# Patient Record
Sex: MALE | Race: WHITE | NOT HISPANIC OR LATINO | URBAN - NONMETROPOLITAN AREA
[De-identification: names, ages, dates, MRNs, and addresses within clinical notes are randomized per-mention and may not be internally consistent; named-entity substitution may affect disease eponyms.]

---

## 2021-01-15 DIAGNOSIS — Z23 NEED FOR VACCINATION: ICD-10-CM

## 2022-10-11 ENCOUNTER — OFFICE VISIT (OUTPATIENT)
Dept: MEDICAL GROUP | Facility: CLINIC | Age: 77
End: 2022-10-11
Payer: MEDICARE

## 2022-10-11 VITALS
SYSTOLIC BLOOD PRESSURE: 112 MMHG | TEMPERATURE: 99.4 F | WEIGHT: 155.6 LBS | OXYGEN SATURATION: 95 % | BODY MASS INDEX: 24.42 KG/M2 | HEART RATE: 79 BPM | HEIGHT: 67 IN | RESPIRATION RATE: 16 BRPM | DIASTOLIC BLOOD PRESSURE: 72 MMHG

## 2022-10-11 DIAGNOSIS — E55.9 VITAMIN D DEFICIENCY: ICD-10-CM

## 2022-10-11 DIAGNOSIS — Z23 NEED FOR VACCINATION: ICD-10-CM

## 2022-10-11 DIAGNOSIS — Z00.00 ROUTINE PHYSICAL EXAMINATION: ICD-10-CM

## 2022-10-11 DIAGNOSIS — E78.5 HYPERLIPIDEMIA, UNSPECIFIED HYPERLIPIDEMIA TYPE: ICD-10-CM

## 2022-10-11 DIAGNOSIS — Z12.11 SCREENING FOR COLORECTAL CANCER: ICD-10-CM

## 2022-10-11 DIAGNOSIS — N40.1 BENIGN PROSTATIC HYPERPLASIA WITH WEAK URINARY STREAM: ICD-10-CM

## 2022-10-11 DIAGNOSIS — G47.20 CIRCADIAN RHYTHM DISORDER: ICD-10-CM

## 2022-10-11 DIAGNOSIS — Z12.12 SCREENING FOR COLORECTAL CANCER: ICD-10-CM

## 2022-10-11 DIAGNOSIS — R39.12 BENIGN PROSTATIC HYPERPLASIA WITH WEAK URINARY STREAM: ICD-10-CM

## 2022-10-11 PROBLEM — K59.09 CHRONIC CONSTIPATION: Status: ACTIVE | Noted: 2022-10-11

## 2022-10-11 PROBLEM — T78.40XA ALLERGY: Status: ACTIVE | Noted: 2022-10-11

## 2022-10-11 PROCEDURE — 99204 OFFICE O/P NEW MOD 45 MIN: CPT | Mod: 25 | Performed by: PHYSICIAN ASSISTANT

## 2022-10-11 PROCEDURE — G0008 ADMIN INFLUENZA VIRUS VAC: HCPCS | Performed by: PHYSICIAN ASSISTANT

## 2022-10-11 PROCEDURE — 90662 IIV NO PRSV INCREASED AG IM: CPT | Performed by: PHYSICIAN ASSISTANT

## 2022-10-11 RX ORDER — TRAZODONE HYDROCHLORIDE 100 MG/1
100 TABLET ORAL NIGHTLY
COMMUNITY

## 2022-10-11 RX ORDER — PRAZOSIN HYDROCHLORIDE 1 MG/1
1 CAPSULE ORAL NIGHTLY
COMMUNITY
End: 2023-01-13

## 2022-10-11 RX ORDER — PHENOL 1.4 %
AEROSOL, SPRAY (ML) MUCOUS MEMBRANE
COMMUNITY

## 2022-10-11 RX ORDER — VITAMIN B COMPLEX
1000 TABLET ORAL DAILY
COMMUNITY

## 2022-10-11 RX ORDER — COVID-19 MOLECULAR TEST ASSAY
KIT MISCELLANEOUS
COMMUNITY
Start: 2022-07-31 | End: 2022-10-11

## 2022-10-11 RX ORDER — CHLORAL HYDRATE 500 MG
1000 CAPSULE ORAL
COMMUNITY

## 2022-10-11 ASSESSMENT — PATIENT HEALTH QUESTIONNAIRE - PHQ9: CLINICAL INTERPRETATION OF PHQ2 SCORE: 0

## 2022-10-11 NOTE — LETTER
[employer]  Lore Hernandez P.A.-C.  [address]  [facility] [address]  Fax: [phone]   Authorization for Release/Disclosure of   Protected Health Information   Name: NOLAN LYNN : 1945 SSN: [id number]   Address: [address] [place] [id number] Phone:    [phone] (home)    I authorize the entity listed below to release/disclose the PHI below to:   [facility]/Lore Hernandez P.A.-C. and Lore Hernandez P.A.-C.   Provider [facility] Chanelle Urban et al   Address   [facility], [address] [address]   Phone:  [phone]    Fax:     Reason for request: continuity of care   Information to be released:    [  ] LAST COLONOSCOPY,  including any PATH REPORT and follow-up  [  ] LAST FIT/COLOGUARD RESULT [  ] LAST DEXA  [  ] LAST MAMMOGRAM  [  ] LAST PAP  [  ] LAST LABS [  ] RETINA EXAM REPORT  [  ] IMMUNIZATION RECORDS  [XXX] Release all info      [XXX] Check here and initial the line next to each item to release ALL health information INCLUDING  _____ Care and treatment for drug and / or alcohol abuse  _____ HIV testing, infection status, or AIDS  _____ Genetic Testing    DATES OF SERVICE OR TIME PERIOD TO BE DISCLOSED: _2017 to present____  I understand and acknowledge that:  * This Authorization may be revoked at any time by you in writing, except if your health information has already been used or disclosed.  * Your health information that will be used or disclosed as a result of you signing this authorization could be re-disclosed by the recipient. If this occurs, your re-disclosed health information may no longer be protected by State or Federal laws.  * You may refuse to sign this Authorization. Your refusal will not affect your ability to obtain treatment.  * This Authorization becomes effective upon signing and will  on (date) __________.      If no date is indicated, this Authorization will   one (1) year from the signature date.    Name: Cordell Tilley    Signature:   Date:     10/11/2022       PLEASE FAX REQUESTED RECORDS BACK TO: [phone]

## 2022-10-11 NOTE — PROGRESS NOTES
Chief Complaint   Patient presents with    [facility]     New patient, [facility]       HPI:  Cordell is a 77 y.o. male new patient presenting to [facility] and discuss the following:    Routine physical examination  Patient here today to [facility].  States he is not currently due for routine fasting labs as [facility].  [facility] provider was previous PCP.  [facility].    Need for vaccination  Patient due for annual influenza vaccine. Given in clinic today without adverse reaction.      Patient Active Problem List    Diagnosis Date Noted    Need for vaccination 12/14/2022    Routine physical examination 12/14/2022    Circadian rhythm disorder 10/11/2022    Vitamin D deficiency 10/11/2022    Benign prostatic hyperplasia with weak urinary stream 10/11/2022    Chronic constipation 10/11/2022    Allergy 10/11/2022    Hyperlipidemia 10/11/2022       Current Outpatient Medications   Medication Sig Dispense Refill    traZODone (DESYREL) 100 MG Tab Take 100 mg by mouth every evening.      Melatonin 10 MG Tab Take  by mouth.      vitamin D3 (CHOLECALCIFEROL) 1000 Unit (25 mcg) Tab Take 1,000 Units by mouth every day.      Multiple Vitamin (MULTI-VITAMIN DAILY PO) Take  by mouth.      Omega-3 Fatty Acids (FISH OIL) 1000 MG Cap capsule Take 1,000 mg by mouth 3 times a day with meals.      prazosin (MINIPRESS) 1 MG Cap Take 1 mg by mouth every evening.       No current facility-administered medications for this visit.       Allergies as of 10/11/2022 - Reviewed 10/11/2022   Allergen Reaction Noted    Statins [hmg-coa-r inhibitors] Myalgia 10/11/2022        Social History     Socioeconomic History    Marital status:      Spouse name: Not on file    Number of children: 5    Years of education: Not on file    Highest education level: Some college, no degree   Occupational History    Not on file   Tobacco Use    Smoking status: Former      Packs/day: 2.00     Years: 33.00     Pack years: 66.00     Types: Cigarettes     Quit date:      Years since quittin.9    Smokeless tobacco: Never   Vaping Use    Vaping Use: Never used   Substance and Sexual Activity    Alcohol use: Not Currently     Comment: Sober since     Drug use: Not Currently    Sexual activity: Not Currently     Partners: Female   Other Topics Concern    Not on file   Social History Narrative    Not on file     Social Determinants of Health     Financial Resource Strain: Not on file   Food Insecurity: Not on file   Transportation Needs: Not on file   Physical Activity: Not on file   Stress: Not on file   Social Connections: Not on file   Intimate Partner Violence: Not on file   Housing Stability: Not on file       Family History   Problem Relation Age of Onset    Cancer Mother         skin and uterine    Alcohol abuse Father     Cancer Sister         throat    Alcohol abuse Brother     Diabetes Neg Hx        Past Surgical History:   Procedure Laterality Date    APPENDECTOMY         Review of Systems:   Constitutional: Negative for fever, chills, weight loss and malaise/fatigue.   HENT: Negative for ear pain, nosebleeds, congestion, sore throat and neck pain.    Eyes: Negative for blurred vision.   Respiratory: Negative for cough, sputum production, shortness of breath and wheezing.    Cardiovascular: Negative for chest pain, palpitations, orthopnea and leg swelling.   Gastrointestinal: Negative for heartburn, nausea, vomiting and abdominal pain.  Positive for chronic constipation.  Genitourinary: Negative for dysuria, urgency and frequency.  Positive diagnosed BPH.  Musculoskeletal: Negative for myalgias, back pain and joint pain.   Skin: Negative for rash and itching.   Neurological: Negative for dizziness, tingling, tremors, sensory change, focal weakness and headaches.   Endo/Heme/Allergies: Does not bruise/bleed easily.   Psychiatric/Behavioral: Negative for depression,  "suicidal ideas and memory loss.  The patient is not nervous/anxious.  Patient does have circadian rhythm disorder.    All other systems reviewed and are negative except as in HPI.    Wt Readings from Last 3 Encounters:   10/14/22 70.3 kg (155 lb)   10/11/22 70.6 kg (155 lb 9.6 oz)   ]    Exam:  /72 (BP Location: Left arm, Patient Position: Sitting, BP Cuff Size: Adult)   Pulse 79   Temp 37.4 °C (99.4 °F) (Temporal)   Resp 16   Ht 1.689 m (5' 6.5\")   Wt 70.6 kg (155 lb 9.6 oz)   SpO2 95%  Body mass index is 24.74 kg/m².   General:  Well nourished, well developed male. No apparent distress. Not ill appearing.  Eyes: EOM intact, PERRL, conjunctiva non-injected, sclera non-icteric.  Neck: Supple with no cervical lymphadenopathy, JVD, palpable thyroid nodules or carotid bruits.  Pulmonary: Clear to ausculation bilaterally. Normal effort. No rales, ronchi, or wheezing.  Cardiovascular: Regular rate and rhythm without murmur, rub or gallop.   Extremities: Full range of motion. Warm and well perfused with no edema.  Skin: Intact with no obvious rashes or lesions.  Neuro: Cranial nerves I-XII grossly intact.  Psych: Alert and oriented x 3.  Appropriately dressed. Mood and affect appropriate.    Assessment/Plan:  1. Routine physical examination        2. Need for vaccination  INFLUENZA VACCINE, HIGH DOSE (65+ ONLY)      3. Screening for colorectal cancer  COLOGUARD (FIT DNA)      4. Circadian rhythm disorder  traZODone (DESYREL) 100 MG Tab    Melatonin 10 MG Tab      5. Vitamin D deficiency  vitamin D3 (CHOLECALCIFEROL) 1000 Unit (25 mcg) Tab      6. Benign prostatic hyperplasia with weak urinary stream  prazosin (MINIPRESS) 1 MG Cap      7. Hyperlipidemia, unspecified hyperlipidemia type  Omega-3 Fatty Acids (FISH OIL) 1000 MG Cap capsule          Reviewed risks and benefits of treatment plan. Patient verbally agrees to plan of care.     Return in about 3 months (around 1/11/2023).    Please note that this " dictation was created using voice recognition software. I have made every reasonable attempt to correct obvious errors, but I expect that there are errors of grammar and possibly content that I did not discover before finalizing the note.

## 2022-10-14 ENCOUNTER — OFFICE VISIT (OUTPATIENT)
Dept: URGENT CARE | Facility: PHYSICIAN GROUP | Age: 77
End: 2022-10-14
Payer: COMMERCIAL

## 2022-10-14 VITALS
HEIGHT: 66 IN | RESPIRATION RATE: 14 BRPM | BODY MASS INDEX: 24.91 KG/M2 | DIASTOLIC BLOOD PRESSURE: 72 MMHG | WEIGHT: 155 LBS | SYSTOLIC BLOOD PRESSURE: 118 MMHG | OXYGEN SATURATION: 97 % | HEART RATE: 74 BPM | TEMPERATURE: 98.3 F

## 2022-10-14 DIAGNOSIS — M54.50 ACUTE BILATERAL LOW BACK PAIN WITHOUT SCIATICA: ICD-10-CM

## 2022-10-14 PROCEDURE — 99214 OFFICE O/P EST MOD 30 MIN: CPT | Performed by: NURSE PRACTITIONER

## 2022-10-14 NOTE — PROGRESS NOTES
"Patient has consented to treatment and for use of patient information for treatment and billing purposes.    Date: 10/14/22     Arrival Mode: Private Vehicle    Chief Complaint:    Chief Complaint   Patient presents with    Back Pain     Acute lower back pain x24hrs.         History of Present Illness: 77 y.o.  male presents to clinic with acute low back pain for the past 24 hours.  Patient denies any direct trauma although does have history of \"throwing his back out \".  Patient states this pain is much more severe.  Patient has taken 1 g of Tylenol with no alleviation of pain.  Patient was unable to ambulate into clinic and did request a wheelchair. Patient does have history of cancer and reports inability to walk due to pain    Patient denies nausea vomiting diarrhea.  Denies burning with urination flank pain or abdominal pain. Denies cough, shortness of breath or lower leg swelling. Patient denies direct trauma, bilateral or progressive neurological deficits as discussed, new urinary retention,  fecal incontinence or saddle anesthesia, IV drug use, fevers, recent surgical procedures. Patient denies ripping sensation in back or history of AAA, coldness or color change in  extremity.       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    Cataract     bilateral with surgical correction    Chronic constipation     Uses over the counter stool softners    COPD (chronic obstructive pulmonary disease) ([facility])     Heart attack ([facility])     Was told by  in [facility]    Hyperlipidemia     Muscle disorder     slow twitch muscle disease        Pertinent Surgical History:  Past Surgical History:   Procedure Laterality Date    APPENDECTOMY          Pertinent Medications:    Current Outpatient Medications on File Prior to Visit   Medication Sig Dispense Refill    traZODone (DESYREL) 100 MG Tab Take 100 mg " by mouth every evening.      Melatonin 10 MG Tab Take  by mouth.      vitamin D3 (CHOLECALCIFEROL) 1000 Unit (25 mcg) Tab Take 1,000 Units by mouth every day.      Multiple Vitamin (MULTI-VITAMIN DAILY PO) Take  by mouth.      Omega-3 Fatty Acids (FISH OIL) 1000 MG Cap capsule Take 1,000 mg by mouth 3 times a day with meals.      prazosin (MINIPRESS) 1 MG Cap Take 1 mg by mouth every evening.       No current facility-administered medications on file prior to visit.        Allergies:    Statins [hmg-coa-r inhibitors]     Social History:  Social History     Tobacco Use    Smoking status: Former     Packs/day: 2.00     Years: 33.00     Pack years: 66.00     Types: Cigarettes     Quit date:      Years since quittin.8    Smokeless tobacco: Never   Vaping Use    Vaping Use: Never used   Substance Use Topics    Alcohol use: Not Currently     Comment: Sober since     Drug use: Not Currently        No LMP for male patient.           Physical Exam:    Vitals:    10/14/22 1420   BP: 118/72   Pulse: 74   Resp: 14   Temp: 36.8 °C (98.3 °F)   SpO2: 97%             Physical Exam  Constitutional:       Appearance: Normal appearance.      Comments: Patient sitting in wheelchair.   Musculoskeletal:      Lumbar back: Swelling, spasms, tenderness and bony tenderness (t-12-s2) present. Decreased range of motion.        Back:       Comments: Did ask patient to stand for exam he is unable to.   Neurological:      Mental Status: He is alert.      Gait: Gait normal.      Comments: Unable to stand or walk             Diagnostics:  None      Medical Decision making and clinic course :    Shared decision-making was utilized with patient for treatment plan. Did discuss that inability to stand or walk is a concern. Unfortunately unable to obtain xray imaging in this clinic. Pain is also out of proportion to exam. Do recommend pt seeks high level of care. Patient states he will seek care at the [facility] in [place] and ask his  daughter to drive him. Patient was wheeled out to waiting room to await transportation.     The patient remained stable during the urgent care visit.    Plan:        1. Acute bilateral low back pain without sciatica         Follow up:    .    Patient was encouraged to monitor symptoms closely. Those signs and symptoms which would warrant concern and mandate seeking a higher level of service through the emergency department discussed at length and included in discharge papers.  Patient stated agreement and understanding of this plan of care.    Disposition:    Higher level of care via private car.       Voice Recognition Disclaimer:  Portions of this document were created using voice recognition software. The software does have a chance of producing errors of grammar and possibly content. I have made every reasonable attempt to correct obvious errors, but there may be errors of grammar and possibly content that I did not discover before finalizing the documentation.    SHELLIE Boyer.

## 2022-12-14 PROBLEM — Z00.00 ROUTINE PHYSICAL EXAMINATION: Status: ACTIVE | Noted: 2022-12-14

## 2022-12-14 PROBLEM — Z23 NEED FOR VACCINATION: Status: ACTIVE | Noted: 2022-12-14

## 2022-12-14 NOTE — ASSESSMENT & PLAN NOTE
Patient here today to establish care.  States he is not currently due for routine fasting labs as he just [facility].  States [facility] provider was [facility] PCP.  Yes, [facility].

## 2023-01-13 ENCOUNTER — OFFICE VISIT (OUTPATIENT)
Dept: MEDICAL GROUP | Facility: CLINIC | Age: 78
End: 2023-01-13
Payer: MEDICARE

## 2023-01-13 VITALS
DIASTOLIC BLOOD PRESSURE: 70 MMHG | HEIGHT: 66 IN | SYSTOLIC BLOOD PRESSURE: 120 MMHG | HEART RATE: 71 BPM | OXYGEN SATURATION: 93 % | BODY MASS INDEX: 24.91 KG/M2 | TEMPERATURE: 98.5 F | RESPIRATION RATE: 20 BRPM | WEIGHT: 155 LBS

## 2023-01-13 DIAGNOSIS — C44.300 SKIN CANCER OF FACE: ICD-10-CM

## 2023-01-13 DIAGNOSIS — Z00.00 ROUTINE PHYSICAL EXAMINATION: ICD-10-CM

## 2023-01-13 PROCEDURE — 99212 OFFICE O/P EST SF 10 MIN: CPT | Performed by: PHYSICIAN ASSISTANT

## 2023-01-13 RX ORDER — ASPIRIN 81 MG/1
81 TABLET ORAL DAILY
Qty: 90 TABLET | Refills: 3 | Status: CANCELLED | OUTPATIENT
Start: 2023-01-13

## 2023-01-13 RX ORDER — ALFUZOSIN HYDROCHLORIDE 10 MG/1
1 TABLET, EXTENDED RELEASE ORAL DAILY
COMMUNITY

## 2023-01-13 RX ORDER — FINASTERIDE 5 MG/1
5 TABLET, FILM COATED ORAL DAILY
COMMUNITY

## 2023-01-13 ASSESSMENT — PATIENT HEALTH QUESTIONNAIRE - PHQ9: CLINICAL INTERPRETATION OF PHQ2 SCORE: 0

## 2023-01-13 NOTE — ASSESSMENT & PLAN NOTE
[facility] and only wants a primary care in the community so he does not have to drive into [place] if he needs something. The other primary care orders all of his labs and medications. Just checking in once a year.

## 2023-01-13 NOTE — PROGRESS NOTES
Chief Complaint   Patient presents with    Annual Exam       HISTORY OF PRESENT ILLNESS: Patient is a 77 y.o. male established patient who presents today to discuss the following issues:    Assessment/Plan  Skin cancer of face  Has had a few lesions removed from his face. He has a MOHS procedure scheduled for next month on his left ear to remove the cancer.    Routine physical examination  [facility] and only wants a primary care in the community so he does not have to drive into [place] if he needs something. The other primary care orders all of his labs and medications. Just checking in once a year.      Reviewed risks and benefits of treatment plan. Patient verbally agrees to plan of care.     Patient Active Problem List    Diagnosis Date Noted    Skin cancer of face 01/13/2023    Need for vaccination 12/14/2022    Routine physical examination 12/14/2022    Circadian rhythm disorder 10/11/2022    Vitamin D deficiency 10/11/2022    Benign prostatic hyperplasia with weak urinary stream 10/11/2022    Chronic constipation 10/11/2022    Allergy 10/11/2022    Hyperlipidemia 10/11/2022       Allergies:Statins [hmg-coa-r inhibitors] and Atorvastatin    Current Outpatient Medications   Medication Sig Dispense Refill    finasteride (PROSCAR) 5 MG Tab Take 5 mg by mouth every day.      alfuzosin (UROXATRAL) 10 MG SR tablet Take 1 Tablet by mouth every day.      traZODone (DESYREL) 100 MG Tab Take 100 mg by mouth every evening.      Melatonin 10 MG Tab Take  by mouth.      vitamin D3 (CHOLECALCIFEROL) 1000 Unit (25 mcg) Tab Take 1,000 Units by mouth every day.      Multiple Vitamin (MULTI-VITAMIN DAILY PO) Take  by mouth.      Omega-3 Fatty Acids (FISH OIL) 1000 MG Cap capsule Take 1,000 mg by mouth 3 times a day with meals.       No current facility-administered medications for this visit.       Wt Readings from Last 3 Encounters:   01/13/23 70.3 kg (155 lb)   10/14/22 70.3 kg (155 lb)   10/11/22 70.6  "kg (155 lb 9.6 oz)   ]    Exam:  /70 (BP Location: Right arm, Patient Position: Sitting, BP Cuff Size: Small adult)   Pulse 71   Temp 36.9 °C (98.5 °F) (Temporal)   Resp 20   Ht 1.676 m (5' 6\")   Wt 70.3 kg (155 lb)   SpO2 93%  Body mass index is 25.02 kg/m².   General:  Well nourished, well developed male. No apparent distress. Not ill appearing.  Eyes: EOM intact, PERRL, conjunctiva non-injected, sclera non-icteric.  Neck: Supple with no cervical lymphadenopathy, JVD, palpable thyroid nodules or carotid bruits.  Pulmonary: Clear to ausculation bilaterally. Normal effort. No rales, ronchi, or wheezing.  Cardiovascular: Regular rate and rhythm without murmur, rub or gallop.   Extremities: Full range of motion. Warm and well perfused with no edema.  Skin: Intact with no obvious rashes or lesions.  Neuro: Cranial nerves I-XII grossly intact.  Psych: Alert and oriented x 3.  Appropriately dressed. Mood and affect appropriate.    Return in about 1 year (around 1/13/2024) for annual exam.  Please note that this dictation was created using voice recognition software. I have made every reasonable attempt to correct obvious errors, but I expect that there are errors of grammar and possibly content that I did not discover before finalizing the note.    "

## 2023-01-13 NOTE — ASSESSMENT & PLAN NOTE
Has had a few lesions removed from his face. He has a MOHS procedure scheduled for next month on his left ear to remove the cancer.

## 2023-02-13 ENCOUNTER — APPOINTMENT (RX ONLY)
Dept: URBAN - METROPOLITAN AREA CLINIC 31 | Facility: CLINIC | Age: 78
Setting detail: DERMATOLOGY
End: 2023-02-13

## 2023-02-13 DIAGNOSIS — L20.89 OTHER ATOPIC DERMATITIS: ICD-10-CM

## 2023-02-13 DIAGNOSIS — D485 NEOPLASM OF UNCERTAIN BEHAVIOR OF SKIN: ICD-10-CM

## 2023-02-13 PROBLEM — D48.5 NEOPLASM OF UNCERTAIN BEHAVIOR OF SKIN: Status: ACTIVE | Noted: 2023-02-13

## 2023-02-13 PROBLEM — C44.91 BASAL CELL CARCINOMA OF SKIN, UNSPECIFIED: Status: ACTIVE | Noted: 2023-02-13

## 2023-02-13 PROBLEM — L20.84 INTRINSIC (ALLERGIC) ECZEMA: Status: ACTIVE | Noted: 2023-02-13

## 2023-02-13 PROCEDURE — ? PRESCRIPTION

## 2023-02-13 PROCEDURE — 69100 BIOPSY OF EXTERNAL EAR: CPT | Mod: 76

## 2023-02-13 PROCEDURE — 88331 PATH CONSLTJ SURG 1 BLK 1SPC: CPT

## 2023-02-13 PROCEDURE — ? ADDITIONAL NOTES

## 2023-02-13 PROCEDURE — ? BIOPSY BY SHAVE METHOD WITH FROZEN SECTION

## 2023-02-13 PROCEDURE — 69100 BIOPSY OF EXTERNAL EAR: CPT

## 2023-02-13 PROCEDURE — ? COUNSELING

## 2023-02-13 RX ORDER — TRIAMCINOLONE ACETONIDE 1 MG/G
OINTMENT TOPICAL
Qty: 80 | Refills: 0 | Status: ERX | COMMUNITY
Start: 2023-02-13

## 2023-02-13 RX ADMIN — TRIAMCINOLONE ACETONIDE: 1 OINTMENT TOPICAL at 00:00

## 2023-02-13 ASSESSMENT — LOCATION DETAILED DESCRIPTION DERM
LOCATION DETAILED: LEFT SUPERIOR HELIX
LOCATION DETAILED: LEFT ANTIHELIX

## 2023-02-13 ASSESSMENT — LOCATION ZONE DERM: LOCATION ZONE: EAR

## 2023-02-13 ASSESSMENT — LOCATION SIMPLE DESCRIPTION DERM: LOCATION SIMPLE: LEFT EAR

## 2023-02-13 NOTE — PROCEDURE: BIOPSY BY SHAVE METHOD WITH FROZEN SECTION
Detail Level: Detailed
Depth Of Biopsy: dermis
Biopsy Type: Frozen Section
Biopsy Method: Dermablade
Anesthesia Type: 1% lidocaine with epinephrine
Anesthesia Volume In Cc: 0.5
Additional Anesthesia Volume In Cc (Will Not Render If 0): 0
Hemostasis: Drysol
Wound Care: Petrolatum
X Size Of Lesion In Cm (Optional): 0.3
Use Enhanced Frozen Section Features: Yes
Enhanced Features Information: The enhanced features will allow you to make use of the built in histology library. In this enhanced mode you will not have to select a frozen section diagnosis as this will automatically be based on the chosen impression. The parent diagnosis will also be overridden if you select a different microscopic description. The enhanced features will allow you develop a small library of gross descriptions to use as well. If you prefer the old method please leave the Use Enhanced Frozen Section Features question set to No.
Number Of Blocks: 1
Processing Note: The specimen was frozen in the cryostat, sectioned and stained.
Render Path Notes In Note?: No
Consent: Written consent was obtained and risks were reviewed including but not limited to scarring, infection, bleeding, scabbing, incomplete removal, nerve damage and allergy to anesthesia.
Post-Care Instructions: I reviewed with the patient in detail post-care instructions. Patient is to keep the biopsy site dry overnight, and then apply bacitracin twice daily until healed. Patient may apply hydrogen peroxide soaks to remove any crusting.
Notification Instructions: Patient will be notified of biopsy results. However, patient instructed to call the office if not contacted within 2 weeks.
Bcc Histology Text: There were numerous aggregates of basaloid cells.
Bcc Infiltrative Histology Text: There were numerous aggregates of basaloid cells demonstrating an infiltrative pattern.
Billing Type: Third-Party Bill
Size Of Lesion In Cm (Optional): 0.7

## 2023-02-13 NOTE — PROCEDURE: MIPS QUALITY
Quality 110: Preventive Care And Screening: Influenza Immunization: Influenza Immunization previously received during influenza season
Quality 111:Pneumonia Vaccination Status For Older Adults: Pneumococcal vaccine (PPSV23) administered on or after patient’s 60th birthday and before the end of the measurement period
Quality 226: Preventive Care And Screening: Tobacco Use: Screening And Cessation Intervention: Patient screened for tobacco use and is an ex/non-smoker
Quality 130: Documentation Of Current Medications In The Medical Record: Current Medications Documented
Quality 265: Biopsy Follow-Up: Biopsy results reviewed, communicated, tracked, and documented
Quality 431: Preventive Care And Screening: Unhealthy Alcohol Use - Screening: Patient not identified as an unhealthy alcohol user when screened for unhealthy alcohol use using a systematic screening method
Detail Level: Detailed

## 2023-02-13 NOTE — HPI: PROCEDURE (MOHS)
Has The Growth Been Previously Biopsied?: has been previously biopsied
Lyubov Rivero Dr. saw me at [facility] on Ethan 10, 2017.  Please refer to the visit note at your convenience and feel free to contact me should you have any questions.  Thank you for the consult. Sincerely,  Juan M Kelly DO, [facility] 
Body Location Override (Optional): Left helix

## 2023-02-13 NOTE — PROCEDURE: ADDITIONAL NOTES
Detail Level: Simple
Render Risk Assessment In Note?: no
Additional Notes: unable to find BCC left helix biopsy site even after reviewing the [facility] the [facility].  I did two frozen section biopsies today to help locate the BCC but both biopsies were negative for BCC.  Will send a note to Dr. Nava to inform him we were unable to find the biopsy site and recommend close clinical follow up.

## 2023-03-23 ENCOUNTER — APPOINTMENT (RX ONLY)
Dept: URBAN - METROPOLITAN AREA CLINIC 31 | Facility: CLINIC | Age: 78
Setting detail: DERMATOLOGY
End: 2023-03-23

## 2023-03-23 DIAGNOSIS — Z85.828 PERSONAL HISTORY OF OTHER MALIGNANT NEOPLASM OF SKIN: ICD-10-CM

## 2023-03-23 DIAGNOSIS — L81.4 OTHER MELANIN HYPERPIGMENTATION: ICD-10-CM

## 2023-03-23 DIAGNOSIS — Z71.89 OTHER SPECIFIED COUNSELING: ICD-10-CM

## 2023-03-23 DIAGNOSIS — D18.0 HEMANGIOMA: ICD-10-CM

## 2023-03-23 DIAGNOSIS — L57.0 ACTINIC KERATOSIS: ICD-10-CM

## 2023-03-23 DIAGNOSIS — L85.3 XEROSIS CUTIS: ICD-10-CM

## 2023-03-23 DIAGNOSIS — D22 MELANOCYTIC NEVI: ICD-10-CM

## 2023-03-23 DIAGNOSIS — L82.1 OTHER SEBORRHEIC KERATOSIS: ICD-10-CM

## 2023-03-23 DIAGNOSIS — R20.2 PARESTHESIA OF SKIN: ICD-10-CM

## 2023-03-23 PROBLEM — D22.5 MELANOCYTIC NEVI OF TRUNK: Status: ACTIVE | Noted: 2023-03-23

## 2023-03-23 PROBLEM — D48.5 NEOPLASM OF UNCERTAIN BEHAVIOR OF SKIN: Status: ACTIVE | Noted: 2023-03-23

## 2023-03-23 PROBLEM — D22.62 MELANOCYTIC NEVI OF LEFT UPPER LIMB, INCLUDING SHOULDER: Status: ACTIVE | Noted: 2023-03-23

## 2023-03-23 PROBLEM — D18.01 HEMANGIOMA OF SKIN AND SUBCUTANEOUS TISSUE: Status: ACTIVE | Noted: 2023-03-23

## 2023-03-23 PROBLEM — D22.61 MELANOCYTIC NEVI OF RIGHT UPPER LIMB, INCLUDING SHOULDER: Status: ACTIVE | Noted: 2023-03-23

## 2023-03-23 PROCEDURE — ? TREATMENT REGIMEN

## 2023-03-23 PROCEDURE — 11102 TANGNTL BX SKIN SINGLE LES: CPT

## 2023-03-23 PROCEDURE — ? BIOPSY BY SHAVE METHOD

## 2023-03-23 PROCEDURE — 17003 DESTRUCT PREMALG LES 2-14: CPT

## 2023-03-23 PROCEDURE — ? COUNSELING

## 2023-03-23 PROCEDURE — ? LIQUID NITROGEN

## 2023-03-23 PROCEDURE — 17000 DESTRUCT PREMALG LESION: CPT | Mod: 59

## 2023-03-23 PROCEDURE — ? ADDITIONAL NOTES

## 2023-03-23 PROCEDURE — 99213 OFFICE O/P EST LOW 20 MIN: CPT | Mod: 25

## 2023-03-23 ASSESSMENT — LOCATION SIMPLE DESCRIPTION DERM
LOCATION SIMPLE: RIGHT UPPER BACK
LOCATION SIMPLE: RIGHT LOWER BACK
LOCATION SIMPLE: LEFT CHEEK
LOCATION SIMPLE: ABDOMEN
LOCATION SIMPLE: RIGHT CHEEK
LOCATION SIMPLE: UPPER BACK
LOCATION SIMPLE: LEFT SHOULDER
LOCATION SIMPLE: LEFT UPPER ARM
LOCATION SIMPLE: RIGHT FOREHEAD
LOCATION SIMPLE: RIGHT UPPER ARM
LOCATION SIMPLE: LEFT EAR
LOCATION SIMPLE: RIGHT SHOULDER
LOCATION SIMPLE: LEFT FOREHEAD
LOCATION SIMPLE: NECK
LOCATION SIMPLE: RIGHT TEMPLE
LOCATION SIMPLE: RIGHT HAND
LOCATION SIMPLE: LEFT HAND
LOCATION SIMPLE: LEFT UPPER BACK
LOCATION SIMPLE: RIGHT FOREARM
LOCATION SIMPLE: LEFT FOREARM

## 2023-03-23 ASSESSMENT — LOCATION DETAILED DESCRIPTION DERM
LOCATION DETAILED: LEFT PROXIMAL POSTERIOR UPPER ARM
LOCATION DETAILED: LEFT FOREHEAD
LOCATION DETAILED: RIGHT SUPERIOR FOREHEAD
LOCATION DETAILED: RIGHT INFERIOR MEDIAL FOREHEAD
LOCATION DETAILED: RIGHT CENTRAL MALAR CHEEK
LOCATION DETAILED: LEFT PROXIMAL DORSAL FOREARM
LOCATION DETAILED: LEFT RADIAL DORSAL HAND
LOCATION DETAILED: RIGHT MEDIAL TEMPLE
LOCATION DETAILED: LEFT LATERAL MALAR CHEEK
LOCATION DETAILED: RIGHT INFERIOR MEDIAL UPPER BACK
LOCATION DETAILED: LEFT CENTRAL MALAR CHEEK
LOCATION DETAILED: LEFT SUPERIOR HELIX
LOCATION DETAILED: EPIGASTRIC SKIN
LOCATION DETAILED: RIGHT ANTERIOR DISTAL UPPER ARM
LOCATION DETAILED: RIGHT ULNAR DORSAL HAND
LOCATION DETAILED: RIGHT SUPERIOR LATERAL NECK
LOCATION DETAILED: LEFT MEDIAL FOREHEAD
LOCATION DETAILED: LEFT LATERAL FOREHEAD
LOCATION DETAILED: INFERIOR THORACIC SPINE
LOCATION DETAILED: RIGHT POSTERIOR SHOULDER
LOCATION DETAILED: LEFT ANTERIOR DISTAL UPPER ARM
LOCATION DETAILED: LEFT VENTRAL DISTAL FOREARM
LOCATION DETAILED: LEFT INFERIOR UPPER BACK
LOCATION DETAILED: RIGHT LATERAL TEMPLE
LOCATION DETAILED: LEFT INFERIOR CENTRAL MALAR CHEEK
LOCATION DETAILED: LEFT POSTERIOR SHOULDER
LOCATION DETAILED: RIGHT PROXIMAL DORSAL FOREARM
LOCATION DETAILED: RIGHT VENTRAL DISTAL FOREARM
LOCATION DETAILED: PERIUMBILICAL SKIN
LOCATION DETAILED: SUPERIOR THORACIC SPINE
LOCATION DETAILED: RIGHT PROXIMAL POSTERIOR UPPER ARM
LOCATION DETAILED: LEFT CENTRAL BUCCAL CHEEK
LOCATION DETAILED: RIGHT SUPERIOR MEDIAL MIDBACK

## 2023-03-23 ASSESSMENT — LOCATION ZONE DERM
LOCATION ZONE: HAND
LOCATION ZONE: EAR
LOCATION ZONE: NECK
LOCATION ZONE: TRUNK
LOCATION ZONE: FACE
LOCATION ZONE: ARM

## 2023-03-23 NOTE — PROCEDURE: MIPS QUALITY
Quality 111:Pneumonia Vaccination Status For Older Adults: Pneumococcal vaccine (PPSV23) administered on or after patient’s 60th birthday and before the end of the measurement period
Detail Level: Detailed
Quality 226: Preventive Care And Screening: Tobacco Use: Screening And Cessation Intervention: Patient screened for tobacco use and is an ex/non-smoker
Quality 130: Documentation Of Current Medications In The Medical Record: Current Medications Documented
Quality 110: Preventive Care And Screening: Influenza Immunization: Influenza Immunization Administered during Influenza season

## 2023-03-23 NOTE — PROCEDURE: ADDITIONAL NOTES
Detail Level: Detailed
Additional Notes: Avoid long hot showers \\nDiscontinue Dial soap, recommend Dove body soap. Instructions reviewed
Render Risk Assessment In Note?: no

## 2023-08-02 ENCOUNTER — APPOINTMENT (RX ONLY)
Dept: URBAN - METROPOLITAN AREA CLINIC 31 | Facility: CLINIC | Age: 78
Setting detail: DERMATOLOGY
End: 2023-08-02

## 2023-08-02 DIAGNOSIS — L57.0 ACTINIC KERATOSIS: ICD-10-CM

## 2023-08-02 DIAGNOSIS — L82.0 INFLAMED SEBORRHEIC KERATOSIS: ICD-10-CM

## 2023-08-02 PROCEDURE — 17000 DESTRUCT PREMALG LESION: CPT | Mod: 59

## 2023-08-02 PROCEDURE — 17003 DESTRUCT PREMALG LES 2-14: CPT | Mod: 59

## 2023-08-02 PROCEDURE — 17110 DESTRUCTION B9 LES UP TO 14: CPT

## 2023-08-02 PROCEDURE — ? LIQUID NITROGEN

## 2023-08-02 ASSESSMENT — LOCATION DETAILED DESCRIPTION DERM
LOCATION DETAILED: NASAL ROOT
LOCATION DETAILED: LEFT SUPERIOR LATERAL BUCCAL CHEEK
LOCATION DETAILED: LEFT LATERAL FOREHEAD
LOCATION DETAILED: RIGHT CENTRAL MALAR CHEEK
LOCATION DETAILED: LEFT INFERIOR LATERAL MALAR CHEEK
LOCATION DETAILED: RIGHT INFERIOR CENTRAL MALAR CHEEK
LOCATION DETAILED: LEFT NASAL SIDEWALL
LOCATION DETAILED: LEFT MID PREAURICULAR CHEEK
LOCATION DETAILED: LEFT MID TEMPLE

## 2023-08-02 ASSESSMENT — LOCATION ZONE DERM
LOCATION ZONE: FACE
LOCATION ZONE: NOSE

## 2023-08-02 ASSESSMENT — LOCATION SIMPLE DESCRIPTION DERM
LOCATION SIMPLE: LEFT TEMPLE
LOCATION SIMPLE: NOSE
LOCATION SIMPLE: LEFT NOSE
LOCATION SIMPLE: LEFT CHEEK
LOCATION SIMPLE: RIGHT CHEEK
LOCATION SIMPLE: LEFT FOREHEAD

## 2023-08-02 NOTE — PROCEDURE: LIQUID NITROGEN
Show Applicator Variable?: Yes
Spray Paint Text: The liquid nitrogen was applied to the skin utilizing a spray paint frosting technique.
Add 52 Modifier (Optional): no
Medical Necessity Information: It is in your best interest to select a reason for this procedure from the list below. All of these items fulfill various CMS LCD requirements except the new and changing color options.
Consent: The patient's consent was obtained including but not limited to risks of crusting, scabbing, blistering, scarring, darker or lighter pigmentary change, recurrence, incomplete removal and infection.
Medical Necessity Clause: This procedure was medically necessary because the lesions that were treated were:
Post-Care Instructions: I reviewed with the patient in detail post-care instructions. Patient is to wear sunprotection, and avoid picking at any of the treated lesions. Pt may apply Vaseline to crusted or scabbing areas.
Detail Level: Detailed
Duration Of Freeze Thaw-Cycle (Seconds): 0

## 2024-02-07 ENCOUNTER — APPOINTMENT (RX ONLY)
Dept: URBAN - METROPOLITAN AREA CLINIC 31 | Facility: CLINIC | Age: 79
Setting detail: DERMATOLOGY
End: 2024-02-07

## 2024-02-07 DIAGNOSIS — D22 MELANOCYTIC NEVI: ICD-10-CM

## 2024-02-07 DIAGNOSIS — L21.8 OTHER SEBORRHEIC DERMATITIS: ICD-10-CM

## 2024-02-07 DIAGNOSIS — L81.4 OTHER MELANIN HYPERPIGMENTATION: ICD-10-CM

## 2024-02-07 DIAGNOSIS — Z85.828 PERSONAL HISTORY OF OTHER MALIGNANT NEOPLASM OF SKIN: ICD-10-CM

## 2024-02-07 DIAGNOSIS — L82.1 OTHER SEBORRHEIC KERATOSIS: ICD-10-CM

## 2024-02-07 DIAGNOSIS — D18.0 HEMANGIOMA: ICD-10-CM

## 2024-02-07 DIAGNOSIS — Z71.89 OTHER SPECIFIED COUNSELING: ICD-10-CM

## 2024-02-07 PROBLEM — D22.5 MELANOCYTIC NEVI OF TRUNK: Status: ACTIVE | Noted: 2024-02-07

## 2024-02-07 PROBLEM — D18.01 HEMANGIOMA OF SKIN AND SUBCUTANEOUS TISSUE: Status: ACTIVE | Noted: 2024-02-07

## 2024-02-07 PROCEDURE — ? PRESCRIPTION

## 2024-02-07 PROCEDURE — ? COUNSELING

## 2024-02-07 PROCEDURE — 99213 OFFICE O/P EST LOW 20 MIN: CPT

## 2024-02-07 RX ORDER — FLUTICASONE PROPIONATE 0.5 MG/G
CREAM TOPICAL
Qty: 30 | Refills: 6 | Status: ERX | COMMUNITY
Start: 2024-02-07

## 2024-02-07 RX ADMIN — FLUTICASONE PROPIONATE: 0.5 CREAM TOPICAL at 00:00

## 2024-02-07 ASSESSMENT — LOCATION ZONE DERM
LOCATION ZONE: EAR
LOCATION ZONE: TRUNK
LOCATION ZONE: FACE

## 2024-02-07 ASSESSMENT — LOCATION SIMPLE DESCRIPTION DERM
LOCATION SIMPLE: RIGHT UPPER BACK
LOCATION SIMPLE: SUPERIOR FOREHEAD
LOCATION SIMPLE: LEFT EAR

## 2024-02-07 ASSESSMENT — LOCATION DETAILED DESCRIPTION DERM
LOCATION DETAILED: SUPERIOR MID FOREHEAD
LOCATION DETAILED: RIGHT INFERIOR UPPER BACK
LOCATION DETAILED: LEFT SUPERIOR HELIX
LOCATION DETAILED: RIGHT MID-UPPER BACK
LOCATION DETAILED: RIGHT SUPERIOR UPPER BACK

## 2024-02-07 NOTE — PROCEDURE: COUNSELING
Detail Level: Generalized
Sunscreen Recommendations: Sun screen (SPF 30 or greater) should be applied during peak UV exposure (between 10am and 2pm) and reapplied after exercise or swimming.\\nRecommended La Roche- Posay Anthelios with SPF 60 or Mitch Tone Water Babies with SPF 30 and above.
Detail Level: Detailed
Detail Level: Zone

## 2024-02-07 NOTE — PROCEDURE: MIPS QUALITY
Detail Level: Detailed
Quality 110: Preventive Care And Screening: Influenza Immunization: Influenza Immunization previously received during influenza season
Quality 111:Pneumonia Vaccination Status For Older Adults: Patient received any pneumococcal conjugate or polysaccharide vaccine on or after their 60th birthday and before the end of the measurement period
Quality 130: Documentation Of Current Medications In The Medical Record: Current Medications Documented
Quality 431: Preventive Care And Screening: Unhealthy Alcohol Use - Screening: Patient not identified as an unhealthy alcohol user when screened for unhealthy alcohol use using a systematic screening method
Quality 226: Preventive Care And Screening: Tobacco Use: Screening And Cessation Intervention: Patient screened for tobacco use and is an ex/non-smoker

## 2024-02-29 ENCOUNTER — RX ONLY (OUTPATIENT)
Age: 79
Setting detail: RX ONLY
End: 2024-02-29

## 2024-02-29 RX ORDER — DESONIDE 0.5 MG/G
CREAM TOPICAL
Qty: 60 | Refills: 5 | Status: ERX | COMMUNITY
Start: 2024-02-29